# Patient Record
Sex: FEMALE | Race: WHITE | NOT HISPANIC OR LATINO | Employment: STUDENT | URBAN - METROPOLITAN AREA
[De-identification: names, ages, dates, MRNs, and addresses within clinical notes are randomized per-mention and may not be internally consistent; named-entity substitution may affect disease eponyms.]

---
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
## 2025-01-10 RX ORDER — INSULIN LISPRO 100 [IU]/ML
INJECTION, SOLUTION SUBCUTANEOUS
COMMUNITY
Start: 2024-12-11

## 2025-01-10 RX ORDER — URINE ACETONE TEST STRIPS
STRIP MISCELLANEOUS
COMMUNITY
Start: 2024-11-08

## 2025-01-10 RX ORDER — BLOOD-GLUCOSE METER
EACH MISCELLANEOUS 3 TIMES DAILY
COMMUNITY
Start: 2024-11-02

## 2025-01-10 RX ORDER — GLUCAGON INJECTION, SOLUTION 1 MG/.2ML
INJECTION, SOLUTION SUBCUTANEOUS
COMMUNITY
Start: 2024-11-27

## 2025-01-10 RX ORDER — INSULIN GLARGINE 100 [IU]/ML
14 INJECTION, SOLUTION SUBCUTANEOUS
COMMUNITY
Start: 2024-11-27

## 2025-01-13 ENCOUNTER — OFFICE VISIT (OUTPATIENT)
Dept: PODIATRY | Facility: HOSPITAL | Age: 20
End: 2025-01-13
Payer: COMMERCIAL

## 2025-01-13 VITALS
OXYGEN SATURATION: 98 % | HEART RATE: 64 BPM | DIASTOLIC BLOOD PRESSURE: 68 MMHG | HEIGHT: 64 IN | SYSTOLIC BLOOD PRESSURE: 110 MMHG | BODY MASS INDEX: 26.67 KG/M2 | WEIGHT: 156.2 LBS

## 2025-01-13 DIAGNOSIS — M79.671 PAIN IN RIGHT FOOT: ICD-10-CM

## 2025-01-13 DIAGNOSIS — B07.0 PLANTAR WARTS: Primary | ICD-10-CM

## 2025-01-13 PROCEDURE — 99213 OFFICE O/P EST LOW 20 MIN: CPT | Performed by: STUDENT IN AN ORGANIZED HEALTH CARE EDUCATION/TRAINING PROGRAM

## 2025-01-13 ASSESSMENT — COLUMBIA-SUICIDE SEVERITY RATING SCALE - C-SSRS
6. HAVE YOU EVER DONE ANYTHING, STARTED TO DO ANYTHING, OR PREPARED TO DO ANYTHING TO END YOUR LIFE?: NO
1. IN THE PAST MONTH, HAVE YOU WISHED YOU WERE DEAD OR WISHED YOU COULD GO TO SLEEP AND NOT WAKE UP?: NO
2. HAVE YOU ACTUALLY HAD ANY THOUGHTS OF KILLING YOURSELF?: NO

## 2025-01-13 ASSESSMENT — PATIENT HEALTH QUESTIONNAIRE - PHQ9
1. LITTLE INTEREST OR PLEASURE IN DOING THINGS: NOT AT ALL
2. FEELING DOWN, DEPRESSED OR HOPELESS: NOT AT ALL
SUM OF ALL RESPONSES TO PHQ9 QUESTIONS 1 AND 2: 0

## 2025-01-13 ASSESSMENT — PAIN SCALES - GENERAL: PAINLEVEL_OUTOF10: 0-NO PAIN

## 2025-01-13 ASSESSMENT — ENCOUNTER SYMPTOMS
DEPRESSION: 0
LOSS OF SENSATION IN FEET: 0
OCCASIONAL FEELINGS OF UNSTEADINESS: 0

## 2025-01-20 ENCOUNTER — LAB (OUTPATIENT)
Dept: LAB | Facility: LAB | Age: 20
End: 2025-01-20
Payer: COMMERCIAL

## 2025-01-20 ENCOUNTER — APPOINTMENT (OUTPATIENT)
Dept: PRIMARY CARE | Facility: CLINIC | Age: 20
End: 2025-01-20
Payer: COMMERCIAL

## 2025-01-20 VITALS
BODY MASS INDEX: 26.46 KG/M2 | WEIGHT: 155 LBS | SYSTOLIC BLOOD PRESSURE: 95 MMHG | HEART RATE: 60 BPM | DIASTOLIC BLOOD PRESSURE: 58 MMHG | OXYGEN SATURATION: 95 % | HEIGHT: 64 IN

## 2025-01-20 DIAGNOSIS — E10.9 TYPE 1 DIABETES MELLITUS WITHOUT COMPLICATION: Primary | ICD-10-CM

## 2025-01-20 DIAGNOSIS — E10.9 TYPE 1 DIABETES MELLITUS WITHOUT COMPLICATION: ICD-10-CM

## 2025-01-20 PROCEDURE — 3074F SYST BP LT 130 MM HG: CPT | Performed by: STUDENT IN AN ORGANIZED HEALTH CARE EDUCATION/TRAINING PROGRAM

## 2025-01-20 PROCEDURE — 86376 MICROSOMAL ANTIBODY EACH: CPT | Performed by: STUDENT IN AN ORGANIZED HEALTH CARE EDUCATION/TRAINING PROGRAM

## 2025-01-20 PROCEDURE — 83516 IMMUNOASSAY NONANTIBODY: CPT | Performed by: STUDENT IN AN ORGANIZED HEALTH CARE EDUCATION/TRAINING PROGRAM

## 2025-01-20 PROCEDURE — 99213 OFFICE O/P EST LOW 20 MIN: CPT | Performed by: STUDENT IN AN ORGANIZED HEALTH CARE EDUCATION/TRAINING PROGRAM

## 2025-01-20 PROCEDURE — 3008F BODY MASS INDEX DOCD: CPT | Performed by: STUDENT IN AN ORGANIZED HEALTH CARE EDUCATION/TRAINING PROGRAM

## 2025-01-20 PROCEDURE — 86800 THYROGLOBULIN ANTIBODY: CPT | Performed by: STUDENT IN AN ORGANIZED HEALTH CARE EDUCATION/TRAINING PROGRAM

## 2025-01-20 PROCEDURE — 87340 HEPATITIS B SURFACE AG IA: CPT | Performed by: STUDENT IN AN ORGANIZED HEALTH CARE EDUCATION/TRAINING PROGRAM

## 2025-01-20 PROCEDURE — 3078F DIAST BP <80 MM HG: CPT | Performed by: STUDENT IN AN ORGANIZED HEALTH CARE EDUCATION/TRAINING PROGRAM

## 2025-01-20 PROCEDURE — 86706 HEP B SURFACE ANTIBODY: CPT | Performed by: STUDENT IN AN ORGANIZED HEALTH CARE EDUCATION/TRAINING PROGRAM

## 2025-01-20 PROCEDURE — 84443 ASSAY THYROID STIM HORMONE: CPT | Performed by: STUDENT IN AN ORGANIZED HEALTH CARE EDUCATION/TRAINING PROGRAM

## 2025-01-20 NOTE — PROGRESS NOTES
"Subjective   Patient ID: Lala Swanson is a 19 y.o. female who presents for Follow-up.  PIPO Gorman is here for follow-up.  Recently diagnosed to have type 1 diabetes following up with endocrinology.  Also reports to have had Lyme's disease and was given doxycycline for 3 weeks.  No symptoms or concerns endorsed  Would like to be checked for celiac panel as well as thyroid.  No past medical history on file.   No past surgical history on file.   No family history on file.   Allergies   Allergen Reactions    House Dust Mite Headache and Runny nose    Penicillins Rash          Occupation:     Review of Systems   Constitutional:  Negative for activity change and fever.   HENT:  Negative for congestion.    Respiratory:  Negative for cough, shortness of breath and wheezing.    Cardiovascular:  Negative for chest pain and leg swelling.   Gastrointestinal:  Negative for abdominal pain, constipation, nausea and vomiting.   Endocrine: Negative for cold intolerance.   Genitourinary:  Negative for dysuria, hematuria and urgency.   Neurological:  Negative for dizziness, speech difficulty, weakness and numbness.   Psychiatric/Behavioral:  Negative for self-injury and suicidal ideas.        Objective   Visit Vitals  BP 95/58 (BP Location: Left arm, Patient Position: Sitting, BP Cuff Size: Adult)   Pulse 60   Ht 1.626 m (5' 4\")   Wt 70.3 kg (155 lb)   SpO2 95%   BMI 26.61 kg/m²   OB Status Having periods   Smoking Status Never   BSA 1.78 m²      Physical Exam  Constitutional:       Appearance: Normal appearance.   HENT:      Head: Normocephalic and atraumatic.      Nose: Nose normal.      Mouth/Throat:      Mouth: Mucous membranes are moist.   Eyes:      Conjunctiva/sclera: Conjunctivae normal.      Pupils: Pupils are equal, round, and reactive to light.   Cardiovascular:      Rate and Rhythm: Normal rate and regular rhythm.      Pulses: Normal pulses.      Heart sounds: Normal heart sounds.   Pulmonary:      Effort: Pulmonary " effort is normal.      Breath sounds: Normal breath sounds.   Musculoskeletal:         General: Normal range of motion.      Cervical back: Neck supple.   Skin:     General: Skin is warm.   Neurological:      General: No focal deficit present.      Mental Status: She is alert and oriented to person, place, and time.   Psychiatric:         Mood and Affect: Mood normal.         Behavior: Behavior normal.         Thought Content: Thought content normal.         Judgment: Judgment normal.         Assessment/Plan   Diagnoses and all orders for this visit:  Type 1 diabetes mellitus without complication  -     TSH with reflex to Free T4 if abnormal; Future  -     Celiac Panel; Future  -     Thyroid Peroxidase (TPO) Antibody; Future  -     Anti-Thyroglobulin Antibody; Future  -     Hepatitis B Surface Antigen; Future  -     Hepatitis B Surface Antibody; Future  -     Pneumococcal conjugate vaccine, 20-valent (PREVNAR 20)       L

## 2025-01-20 NOTE — PROGRESS NOTES
"PODIATRIC MEDICINE & SURGERY - NEW PATIENT VISIT  Subjective   Lala Swanson is a 19 y.o. female who presents to [facility] care with chief complaint of No chief complaint on file..      HPI: Patient presents for evaluation of calluses and. Patient reports pain when playing soccer. Reports a previous injury while playing soccer. Patient has tried nsaids, with minimal relief. Patient denies numbness or tingling to the lower extremities. Report history of warts with treatment from other providers without success. Patient denies CP, SOB, fever/chills or N/V/D. No further complaints.    Activity level: Patient ambulates unassisted.  PCP: MD BREE Obregon  Constitutional: Negative.    HENNT: Negative.     Eyes: Negative.    Respiratory: Negative.     Cardiovascular: Negative.    Gastrointestinal: Negative.    Endocrine: Negative.    Genitourinary: Negative.    Musculoskeletal: Positive pain to the lateral aspect of foot  Skin: Negative.    No past medical history on file.    No past surgical history on file.    Social History     Tobacco Use    Smoking status: Never     Passive exposure: Never    Smokeless tobacco: Never   Vaping Use    Vaping status: Never Used   Substance Use Topics    Alcohol use: Never    Drug use: Never       Allergies   Allergen Reactions    House Dust Mite Headache and Runny nose    Penicillins Rash       Medications  Aerochamber MV spacer  albuterol  cetirizine  erythromycin  escitalopram  Gvoke HypoPen 2-Pack auto-injector  insulin glargine  insulin lispro  Ketostix strip  mometasone-formoterol  OneTouch Verio test strips strip    Objective   Visit Vitals  /68 (BP Location: Left arm, Patient Position: Sitting)   Pulse 64   Ht 1.626 m (5' 4\")   Wt 70.9 kg (156 lb 3.2 oz)   SpO2 98%   BMI 26.81 kg/m²   OB Status Having periods   Smoking Status Never   BSA 1.79 m²       Physical Exam  Constitutional: NAD and AAOx3.   HEENT: Head is normocephalic and atraumatic. External ear " is normal B/L. Conjunctivae normal B/L. Nose is normal. Oropharynx is clear. Mouth is moist.   Cardiovascular: Normal rate.  Pulmonary: No increased work of breathing.  Psychological: Appropriate mood and behavior.    Vascular: DP and PT pulses are palpable B/L. Capillary fill time is brisk. Skin temperature warm to warm from proximal to distal lower extremity B/L. No edema or varicosities.    Neurologic: No focal deficits. Light touch intact to lower extremity B/L.     Orthopaedic: Ambulates unassisted. Normal medial longitudinal arch. Muscle strength is appropriate to age and activity level. With dorsiflexion, plantarflexion, inversion, and eversion of foot B/L. ROM of the 1st MTPJ, MTJ, STJ and ankle joint are WNL without pain or crepitus.     Dermatologic: Nails 1-5 B/L feet are. The interdigital spaces are clean and dry to B/L feet. The skin is well hydrated with normal texture and turgor. There are diffuse, minimal verruca noted to the right plantar foot. These disrupt the skin lines.    Lab Results   Component Value Date    WBC 4.8 08/07/2024    HGB 13.2 08/07/2024    HCT 40.4 08/07/2024     08/07/2024    CHOL 136 08/07/2024    TRIG 115 08/07/2024    HDL 56.8 08/07/2024    ALT 14 08/07/2024    AST 17 08/07/2024     08/07/2024    K 4.4 08/07/2024     08/07/2024    CREATININE 0.70 08/07/2024    BUN 8 08/07/2024    CO2 28 08/07/2024    TSH 1.33 08/07/2024    HGBA1C 6.0 (H) 08/07/2024       Cultures:  No results found for the last 90 days.      IMAGING  No results found.    Assessment/Plan   1. Plantar warts    2. Pain in right foot        Initial patient visit. Patient examined and evaluated.  Reviewed labs, imaging and notes. Dicussed findings with patient. Answered questions to patient's apparent satisfaction.    Plan:  - Physical exam was performed and findings were discussed with patient  - Patient's plantar warts were debrided  - Recommended and educated patient on use of prescription  medication to apply to affected areas and cover with bandaid or tegaderm. Will send Rx to [facility].   - Patient can RTC as needed if symptoms persist at [facility] as she is from out of town and will most likely not be here for follow-up appointment    Patient examined and evaluated with attending physician.    Beltran Rinaldi DPM PGY-1     I saw and evaluated the patient. I personally obtained the key and critical portions of the history and physical exam or was physically present for key and critical portions performed by the resident/fellow. I reviewed the resident/fellow's documentation and discussed the patient with the resident/fellow. I agree with the resident/fellow's medical decision making as documented in the note.    I spent 35 minutes in the professional and overall care of this patient.    Kim Schulte DPM

## 2025-01-21 LAB
GLIADIN PEPTIDE IGA SER IA-ACNC: <1 U/ML
HBV SURFACE AB SER-ACNC: <3.1 MIU/ML
HBV SURFACE AG SERPL QL IA: NONREACTIVE
THYROPEROXIDASE AB SERPL-ACNC: 28 IU/ML
TSH SERPL-ACNC: 1.89 MIU/L (ref 0.44–3.98)
TTG IGA SER IA-ACNC: <1 U/ML

## 2025-01-22 ENCOUNTER — OFFICE VISIT (OUTPATIENT)
Dept: PRIMARY CARE | Facility: CLINIC | Age: 20
End: 2025-01-22
Payer: COMMERCIAL

## 2025-01-22 ENCOUNTER — TELEPHONE (OUTPATIENT)
Dept: PRIMARY CARE | Facility: CLINIC | Age: 20
End: 2025-01-22

## 2025-01-22 LAB
GLIADIN PEPTIDE IGG SER IA-ACNC: 0.7 FLU (ref 0–4.99)
THYROGLOB AB SERPL-ACNC: <0.9 IU/ML (ref 0–4)
TTG IGG SER IA-ACNC: <0.82 FLU (ref 0–4.99)

## 2025-03-03 ASSESSMENT — ENCOUNTER SYMPTOMS
SHORTNESS OF BREATH: 0
SPEECH DIFFICULTY: 0
FEVER: 0
HEMATURIA: 0
ACTIVITY CHANGE: 0
CONSTIPATION: 0
DIZZINESS: 0
NAUSEA: 0
COUGH: 0
ABDOMINAL PAIN: 0
WHEEZING: 0
WEAKNESS: 0
DYSURIA: 0
NUMBNESS: 0
VOMITING: 0

## 2025-03-19 ENCOUNTER — APPOINTMENT (OUTPATIENT)
Dept: OPHTHALMOLOGY | Facility: CLINIC | Age: 20
End: 2025-03-19
Payer: COMMERCIAL

## 2025-05-30 DIAGNOSIS — J45.40 MODERATE PERSISTENT ASTHMA WITHOUT COMPLICATION (HHS-HCC): ICD-10-CM

## 2025-05-30 DIAGNOSIS — F41.9 ANXIETY: ICD-10-CM

## 2025-05-30 RX ORDER — ESCITALOPRAM OXALATE 5 MG/1
TABLET ORAL
Qty: 135 TABLET | Refills: 0 | Status: SHIPPED | OUTPATIENT
Start: 2025-05-30

## 2025-06-02 ENCOUNTER — APPOINTMENT (OUTPATIENT)
Dept: PRIMARY CARE | Facility: CLINIC | Age: 20
End: 2025-06-02
Payer: COMMERCIAL

## 2025-06-02 DIAGNOSIS — Z00.00 HEALTH CARE MAINTENANCE: Primary | ICD-10-CM

## 2025-06-02 PROCEDURE — 90739 HEPB VACC 2/4 DOSE ADULT IM: CPT | Performed by: STUDENT IN AN ORGANIZED HEALTH CARE EDUCATION/TRAINING PROGRAM

## 2025-06-02 PROCEDURE — 90471 IMMUNIZATION ADMIN: CPT | Performed by: STUDENT IN AN ORGANIZED HEALTH CARE EDUCATION/TRAINING PROGRAM

## 2025-06-03 ENCOUNTER — HOSPITAL ENCOUNTER (OUTPATIENT)
Dept: RADIOLOGY | Facility: CLINIC | Age: 20
Discharge: HOME | End: 2025-06-03
Payer: COMMERCIAL

## 2025-06-03 ENCOUNTER — TELEPHONE (OUTPATIENT)
Dept: ORTHOPEDIC SURGERY | Facility: HOSPITAL | Age: 20
End: 2025-06-03

## 2025-06-03 ENCOUNTER — APPOINTMENT (OUTPATIENT)
Dept: ORTHOPEDIC SURGERY | Facility: CLINIC | Age: 20
End: 2025-06-03
Payer: COMMERCIAL

## 2025-06-03 DIAGNOSIS — Q65.89 ACETABULAR DYSPLASIA (HHS-HCC): ICD-10-CM

## 2025-06-03 DIAGNOSIS — G89.29 HIP PAIN, CHRONIC, UNSPECIFIED LATERALITY: Primary | ICD-10-CM

## 2025-06-03 DIAGNOSIS — M25.559 HIP PAIN, CHRONIC, UNSPECIFIED LATERALITY: Primary | ICD-10-CM

## 2025-06-03 DIAGNOSIS — M25.559 HIP PAIN, CHRONIC, UNSPECIFIED LATERALITY: ICD-10-CM

## 2025-06-03 DIAGNOSIS — G89.29 HIP PAIN, CHRONIC, UNSPECIFIED LATERALITY: ICD-10-CM

## 2025-06-03 PROCEDURE — 73523 X-RAY EXAM HIPS BI 5/> VIEWS: CPT

## 2025-06-03 PROCEDURE — 73523 X-RAY EXAM HIPS BI 5/> VIEWS: CPT | Mod: BILATERAL PROCEDURE | Performed by: RADIOLOGY

## 2025-06-03 PROCEDURE — 99202 OFFICE O/P NEW SF 15 MIN: CPT | Performed by: STUDENT IN AN ORGANIZED HEALTH CARE EDUCATION/TRAINING PROGRAM

## 2025-06-03 PROCEDURE — 99204 OFFICE O/P NEW MOD 45 MIN: CPT | Performed by: STUDENT IN AN ORGANIZED HEALTH CARE EDUCATION/TRAINING PROGRAM

## 2025-06-03 RX ORDER — ALBUTEROL SULFATE 90 UG/1
2 INHALANT RESPIRATORY (INHALATION) EVERY 4 HOURS PRN
Qty: 18 G | Refills: 1 | Status: SHIPPED | OUTPATIENT
Start: 2025-06-03

## 2025-06-03 NOTE — TELEPHONE ENCOUNTER
Copied from CRM #[id number]. Topic: Information Request - Doctor, Hospital, or Provider  >> Gamaliel 3, 2025 12:10 PM Leida GIBBS wrote:  Information has been provided to Patient.  Patient stated that's he is supposed to have a referral for physical therapy she stated the provider wants it urgent no referral on file.      Addendum: called patient to advise her PT script was in and she could schedule at any  location. If she plans on going elsewhere, she may use the script I emailed to her. Informed patient that our team reached out to the [facility] providers and they will be calling her to make an appointment. She appreciated the call.

## 2025-06-04 ENCOUNTER — EVALUATION (OUTPATIENT)
Dept: PHYSICAL THERAPY | Facility: CLINIC | Age: 20
End: 2025-06-04
Payer: COMMERCIAL

## 2025-06-04 DIAGNOSIS — M25.552 LEFT HIP PAIN: Primary | ICD-10-CM

## 2025-06-04 PROCEDURE — 97161 PT EVAL LOW COMPLEX 20 MIN: CPT | Mod: GP | Performed by: PHYSICAL THERAPIST

## 2025-06-04 PROCEDURE — 97535 SELF CARE MNGMENT TRAINING: CPT | Mod: GP | Performed by: PHYSICAL THERAPIST

## 2025-06-04 ASSESSMENT — ENCOUNTER SYMPTOMS
OCCASIONAL FEELINGS OF UNSTEADINESS: 0
LOSS OF SENSATION IN FEET: 0
DEPRESSION: 0

## 2025-06-04 ASSESSMENT — COLUMBIA-SUICIDE SEVERITY RATING SCALE - C-SSRS
6. HAVE YOU EVER DONE ANYTHING, STARTED TO DO ANYTHING, OR PREPARED TO DO ANYTHING TO END YOUR LIFE?: NO
2. HAVE YOU ACTUALLY HAD ANY THOUGHTS OF KILLING YOURSELF?: NO
1. IN THE PAST MONTH, HAVE YOU WISHED YOU WERE DEAD OR WISHED YOU COULD GO TO SLEEP AND NOT WAKE UP?: NO

## 2025-06-04 NOTE — PROGRESS NOTES
Physical Therapy Evaluation    Patient Name: Lala Swanson  MRN: [id number]  Today's Date: 6/4/2025  Referred by: Dr. Bueno  Visit: 1/40    Time Calculation  Start Time: 1715  Stop Time: 1800  Time Calculation (min): 45 min  Diagnosis:  1. 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  Pain:  0/10  Home Living:  College in [place] [facility], currently home with parents   Prior level of function:  No history of hip issues  Personal Factors Impacting Care:  None    OBJECTIVE:  Hip AROM: (degrees) Left Right   Flexion wnl    Abduction wnl    External Rotation wnl    Internal Rotation wnl      Hip PROM: (degrees) Left Right   Flexion 110    Abduction 60    External Rotation 45    Internal Rotation 45      Hip Strength: MMT Left Right   Flexion 5/5 /5   Extension 5/5 /5   Abduction 4-/5 /5   External Rotation 4/5 /5   *denotes pain with motion or muscle testing    Gait:  Normal  Palpation:  -TTP  Flexibility:   Hamstrings: slight tightness   Quadriceps:   Hip Flexors: slight tightness    Functional Outcome Measure:  LEFS 80/80    ASSESSMENT:  Patient presents with c/o left hip feelings of instability, diagnosed with dysplasia, overall doing very well but does present with some posterior hip apprehension, weakness noted in hip rotators and abductors, will benefit from consistent progressive strengthening program and instructed to avoid end range hip positions to allow for capsular healing, will be out of town rest of Summer at [place] but will reach out via email with update and  questions.    Patient presents with the following deficits/problems that indicate the need for skilled PT: left hip weakness.    Low complexity due to patient's clinical presentation being stable and uncomplicated by any significant comorbidities that may affect rehab tolerance and progression.     Clinical presentation:  Stable and/or uncomplicated characteristics    TREATMENT:  Initial evaluation performed followed by discussion of findings and instruction in HEP.    PATIENT EDUCATION:  Access Code: [email or web address]  URL: [email or web address]  Date: 06/04/2025  Prepared by: Arnel Spicer    Exercises  - Single Leg Bridge  - 1 x daily - 7 x weekly - 2-3 sets - 10-15 reps  - Single Leg Bridge with Resistance Loop  - 1 x daily - 7 x weekly - 2-3 sets - 10-15 reps  - Clamshell with Resistance  - 1 x daily - 7 x weekly - 2-3 sets - 10-15 reps  - Sidelying Hip Abduction with Resistance at Thighs  - 1 x daily - 7 x weekly - 2-3 sets - 10-15 reps    PLAN:   HEP daily, no further therapy anticipated as she will be out of town and back to school in Fall, will reach out via email with issues.    Rehab potential:  Good  Plan of care agreement  Patient    GOALS:  Active       PT Problem       Independent with HEP       Start:  06/04/25    Expected End:  06/04/25

## 2025-06-07 NOTE — PROGRESS NOTES
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  She reports that pain began this past spring during spring soccer.  Pain localized to the anterior lateral aspect of the hip and does not radiate.  Pain has improved since the spring season has ended, but reports that hip just does not feel right.  She has tried activity modification, working with her , and anti-inflammatory medication.  No treatment to her knowledge for hip dysplasia as an infant.  No other orthopedic complaints.    EXAM:  General: Well-appearing in no acute distress   Neuro: Alert and oriented x3.  Gross motor and sensory function intact.    HEENT: Head normocephalic and atraumatic  Spine: Shoulders and pelvis level.  Spine clinically straight.  No midline or paraspinal tenderness.  Full, painless ROM at the cervical and lumbar spine.  Patellar and ankle jerk reflexes 2+ and symmetric bilaterally.    Hips:      IMAGING:   X-rays of the bilateral hips were personally reviewed and demonstrate ***.    Tonnis: *** / ***  LCEA: *** / ***  Crossover sign: *** / ***  Ischial spine sign: *** / ***  ACEA: *** / ***  Alpha angle: *** / ***  OA (Tonnis grade): *** / ***      ASSESSMENT:  ***    PLAN:  Imaging and exam findings were discussed.        Mady Bueno MD         deformity.      PLAN:  Imaging and exam findings were discussed.  The patient reports that her family just moved to [place] and that she is only in town for the next week or so before going to soccer camp in [place] and then going to preseason back in [place].  I will have her see one of our physical therapists for evaluation and home exercise program that she can start while at camp.  I have also provided her with Dr. Gena Emerson's information at [facility] so that she can establish care in [place] should her pain persist after returning to school.  The patient verbalized understanding is in agreement treatment plan.  All questions answered.      Mady Bueno MD

## 2025-08-28 DIAGNOSIS — F41.9 ANXIETY: ICD-10-CM

## 2025-08-28 RX ORDER — ESCITALOPRAM OXALATE 5 MG/1
TABLET ORAL
Qty: 135 TABLET | Refills: 1 | Status: SHIPPED | OUTPATIENT
Start: 2025-08-28